# Patient Record
Sex: FEMALE | Race: WHITE | NOT HISPANIC OR LATINO
[De-identification: names, ages, dates, MRNs, and addresses within clinical notes are randomized per-mention and may not be internally consistent; named-entity substitution may affect disease eponyms.]

---

## 2018-12-03 ENCOUNTER — APPOINTMENT (OUTPATIENT)
Dept: RHEUMATOLOGY | Facility: CLINIC | Age: 48
End: 2018-12-03
Payer: COMMERCIAL

## 2018-12-03 VITALS
OXYGEN SATURATION: 99 % | DIASTOLIC BLOOD PRESSURE: 78 MMHG | TEMPERATURE: 98.2 F | HEIGHT: 60 IN | SYSTOLIC BLOOD PRESSURE: 125 MMHG | BODY MASS INDEX: 22.19 KG/M2 | HEART RATE: 90 BPM | WEIGHT: 113 LBS

## 2018-12-03 DIAGNOSIS — M25.50 PAIN IN UNSPECIFIED JOINT: ICD-10-CM

## 2018-12-03 DIAGNOSIS — Z80.3 FAMILY HISTORY OF MALIGNANT NEOPLASM OF BREAST: ICD-10-CM

## 2018-12-03 DIAGNOSIS — G25.0 ESSENTIAL TREMOR: ICD-10-CM

## 2018-12-03 DIAGNOSIS — Z87.39 PERSONAL HISTORY OF OTHER DISEASES OF THE MUSCULOSKELETAL SYSTEM AND CONNECTIVE TISSUE: ICD-10-CM

## 2018-12-03 DIAGNOSIS — Z87.76 PERSONAL HISTORY OF (CORRECTED) CONGENITAL MALFORMATIONS OF INTEGUMENT, LIMBS AND MUSCULOSKELETAL SYSTEM: ICD-10-CM

## 2018-12-03 DIAGNOSIS — K21.9 GASTRO-ESOPHAGEAL REFLUX DISEASE W/OUT ESOPHAGITIS: ICD-10-CM

## 2018-12-03 DIAGNOSIS — M79.7 FIBROMYALGIA: ICD-10-CM

## 2018-12-03 DIAGNOSIS — Z86.59 PERSONAL HISTORY OF OTHER MENTAL AND BEHAVIORAL DISORDERS: ICD-10-CM

## 2018-12-03 DIAGNOSIS — M19.90 UNSPECIFIED OSTEOARTHRITIS, UNSPECIFIED SITE: ICD-10-CM

## 2018-12-03 DIAGNOSIS — Z78.9 OTHER SPECIFIED HEALTH STATUS: ICD-10-CM

## 2018-12-03 PROCEDURE — 99205 OFFICE O/P NEW HI 60 MIN: CPT

## 2018-12-03 RX ORDER — SUCRALFATE 1 G/1
1 TABLET ORAL
Refills: 0 | Status: ACTIVE | COMMUNITY
Start: 2018-12-03

## 2018-12-03 RX ORDER — CYCLOBENZAPRINE HYDROCHLORIDE 5 MG/1
5 TABLET, FILM COATED ORAL
Qty: 15 | Refills: 0 | Status: ACTIVE | COMMUNITY
Start: 2018-12-03

## 2020-02-06 ENCOUNTER — EMERGENCY (EMERGENCY)
Facility: HOSPITAL | Age: 50
LOS: 1 days | Discharge: HOME | End: 2020-02-06
Attending: EMERGENCY MEDICINE | Admitting: EMERGENCY MEDICINE
Payer: COMMERCIAL

## 2020-02-06 VITALS
HEIGHT: 60 IN | OXYGEN SATURATION: 100 % | RESPIRATION RATE: 16 BRPM | HEART RATE: 92 BPM | DIASTOLIC BLOOD PRESSURE: 85 MMHG | SYSTOLIC BLOOD PRESSURE: 142 MMHG | WEIGHT: 115.08 LBS | TEMPERATURE: 98 F

## 2020-02-06 VITALS
DIASTOLIC BLOOD PRESSURE: 76 MMHG | RESPIRATION RATE: 15 BRPM | SYSTOLIC BLOOD PRESSURE: 117 MMHG | OXYGEN SATURATION: 99 % | HEART RATE: 62 BPM | TEMPERATURE: 98 F

## 2020-02-06 LAB
ALBUMIN SERPL ELPH-MCNC: 4.2 G/DL — SIGNIFICANT CHANGE UP (ref 3.3–5)
ALP SERPL-CCNC: 57 U/L — SIGNIFICANT CHANGE UP (ref 40–120)
ALT FLD-CCNC: 17 U/L — SIGNIFICANT CHANGE UP (ref 12–78)
ANION GAP SERPL CALC-SCNC: 7 MMOL/L — SIGNIFICANT CHANGE UP (ref 5–17)
AST SERPL-CCNC: 19 U/L — SIGNIFICANT CHANGE UP (ref 15–37)
BASOPHILS # BLD AUTO: 0.03 K/UL — SIGNIFICANT CHANGE UP (ref 0–0.2)
BASOPHILS NFR BLD AUTO: 0.5 % — SIGNIFICANT CHANGE UP (ref 0–2)
BILIRUB SERPL-MCNC: 0.3 MG/DL — SIGNIFICANT CHANGE UP (ref 0.2–1.2)
BUN SERPL-MCNC: 24 MG/DL — HIGH (ref 7–23)
CALCIUM SERPL-MCNC: 9.1 MG/DL — SIGNIFICANT CHANGE UP (ref 8.5–10.1)
CHLORIDE SERPL-SCNC: 107 MMOL/L — SIGNIFICANT CHANGE UP (ref 96–108)
CO2 SERPL-SCNC: 25 MMOL/L — SIGNIFICANT CHANGE UP (ref 22–31)
CREAT SERPL-MCNC: 0.6 MG/DL — SIGNIFICANT CHANGE UP (ref 0.5–1.3)
D DIMER BLD IA.RAPID-MCNC: <150 NG/ML DDU — SIGNIFICANT CHANGE UP
EOSINOPHIL # BLD AUTO: 0.02 K/UL — SIGNIFICANT CHANGE UP (ref 0–0.5)
EOSINOPHIL NFR BLD AUTO: 0.4 % — SIGNIFICANT CHANGE UP (ref 0–6)
GLUCOSE SERPL-MCNC: 89 MG/DL — SIGNIFICANT CHANGE UP (ref 70–99)
HCG SERPL-ACNC: <1 MIU/ML — SIGNIFICANT CHANGE UP
HCT VFR BLD CALC: 38.7 % — SIGNIFICANT CHANGE UP (ref 34.5–45)
HGB BLD-MCNC: 13.1 G/DL — SIGNIFICANT CHANGE UP (ref 11.5–15.5)
IMM GRANULOCYTES NFR BLD AUTO: 0.2 % — SIGNIFICANT CHANGE UP (ref 0–1.5)
LYMPHOCYTES # BLD AUTO: 1.55 K/UL — SIGNIFICANT CHANGE UP (ref 1–3.3)
LYMPHOCYTES # BLD AUTO: 28.2 % — SIGNIFICANT CHANGE UP (ref 13–44)
MCHC RBC-ENTMCNC: 30.8 PG — SIGNIFICANT CHANGE UP (ref 27–34)
MCHC RBC-ENTMCNC: 33.9 GM/DL — SIGNIFICANT CHANGE UP (ref 32–36)
MCV RBC AUTO: 91.1 FL — SIGNIFICANT CHANGE UP (ref 80–100)
MONOCYTES # BLD AUTO: 0.3 K/UL — SIGNIFICANT CHANGE UP (ref 0–0.9)
MONOCYTES NFR BLD AUTO: 5.5 % — SIGNIFICANT CHANGE UP (ref 2–14)
NEUTROPHILS # BLD AUTO: 3.59 K/UL — SIGNIFICANT CHANGE UP (ref 1.8–7.4)
NEUTROPHILS NFR BLD AUTO: 65.2 % — SIGNIFICANT CHANGE UP (ref 43–77)
NRBC # BLD: 0 /100 WBCS — SIGNIFICANT CHANGE UP (ref 0–0)
PLATELET # BLD AUTO: 257 K/UL — SIGNIFICANT CHANGE UP (ref 150–400)
POTASSIUM SERPL-MCNC: 3.9 MMOL/L — SIGNIFICANT CHANGE UP (ref 3.5–5.3)
POTASSIUM SERPL-SCNC: 3.9 MMOL/L — SIGNIFICANT CHANGE UP (ref 3.5–5.3)
PROT SERPL-MCNC: 7.3 G/DL — SIGNIFICANT CHANGE UP (ref 6–8.3)
RBC # BLD: 4.25 M/UL — SIGNIFICANT CHANGE UP (ref 3.8–5.2)
RBC # FLD: 12.9 % — SIGNIFICANT CHANGE UP (ref 10.3–14.5)
SODIUM SERPL-SCNC: 139 MMOL/L — SIGNIFICANT CHANGE UP (ref 135–145)
TROPONIN I SERPL-MCNC: <.015 NG/ML — SIGNIFICANT CHANGE UP (ref 0.01–0.04)
WBC # BLD: 5.5 K/UL — SIGNIFICANT CHANGE UP (ref 3.8–10.5)
WBC # FLD AUTO: 5.5 K/UL — SIGNIFICANT CHANGE UP (ref 3.8–10.5)

## 2020-02-06 PROCEDURE — 73502 X-RAY EXAM HIP UNI 2-3 VIEWS: CPT

## 2020-02-06 PROCEDURE — 96361 HYDRATE IV INFUSION ADD-ON: CPT

## 2020-02-06 PROCEDURE — 85379 FIBRIN DEGRADATION QUANT: CPT

## 2020-02-06 PROCEDURE — 84702 CHORIONIC GONADOTROPIN TEST: CPT

## 2020-02-06 PROCEDURE — 96374 THER/PROPH/DIAG INJ IV PUSH: CPT

## 2020-02-06 PROCEDURE — 99285 EMERGENCY DEPT VISIT HI MDM: CPT

## 2020-02-06 PROCEDURE — 85027 COMPLETE CBC AUTOMATED: CPT

## 2020-02-06 PROCEDURE — 84484 ASSAY OF TROPONIN QUANT: CPT

## 2020-02-06 PROCEDURE — 99284 EMERGENCY DEPT VISIT MOD MDM: CPT

## 2020-02-06 PROCEDURE — 36415 COLL VENOUS BLD VENIPUNCTURE: CPT

## 2020-02-06 PROCEDURE — 73502 X-RAY EXAM HIP UNI 2-3 VIEWS: CPT | Mod: 26,RT

## 2020-02-06 PROCEDURE — 99284 EMERGENCY DEPT VISIT MOD MDM: CPT | Mod: 25

## 2020-02-06 PROCEDURE — 96375 TX/PRO/DX INJ NEW DRUG ADDON: CPT

## 2020-02-06 PROCEDURE — 82962 GLUCOSE BLOOD TEST: CPT

## 2020-02-06 PROCEDURE — 80053 COMPREHEN METABOLIC PANEL: CPT

## 2020-02-06 RX ORDER — SODIUM CHLORIDE 9 MG/ML
500 INJECTION INTRAMUSCULAR; INTRAVENOUS; SUBCUTANEOUS ONCE
Refills: 0 | Status: COMPLETED | OUTPATIENT
Start: 2020-02-06 | End: 2020-02-06

## 2020-02-06 RX ORDER — MORPHINE SULFATE 50 MG/1
4 CAPSULE, EXTENDED RELEASE ORAL ONCE
Refills: 0 | Status: DISCONTINUED | OUTPATIENT
Start: 2020-02-06 | End: 2020-02-06

## 2020-02-06 RX ORDER — KETOROLAC TROMETHAMINE 30 MG/ML
30 SYRINGE (ML) INJECTION ONCE
Refills: 0 | Status: DISCONTINUED | OUTPATIENT
Start: 2020-02-06 | End: 2020-02-06

## 2020-02-06 RX ADMIN — MORPHINE SULFATE 4 MILLIGRAM(S): 50 CAPSULE, EXTENDED RELEASE ORAL at 13:40

## 2020-02-06 RX ADMIN — SODIUM CHLORIDE 500 MILLILITER(S): 9 INJECTION INTRAMUSCULAR; INTRAVENOUS; SUBCUTANEOUS at 13:49

## 2020-02-06 RX ADMIN — MORPHINE SULFATE 4 MILLIGRAM(S): 50 CAPSULE, EXTENDED RELEASE ORAL at 13:24

## 2020-02-06 RX ADMIN — Medication 30 MILLIGRAM(S): at 13:07

## 2020-02-06 RX ADMIN — Medication 30 MILLIGRAM(S): at 12:49

## 2020-02-06 RX ADMIN — SODIUM CHLORIDE 500 MILLILITER(S): 9 INJECTION INTRAMUSCULAR; INTRAVENOUS; SUBCUTANEOUS at 12:49

## 2020-02-06 NOTE — ED PROVIDER NOTE - NS ED ROS FT
Constitutional: - Fever, - Chills, - Anorexia, - Fatigue, - Night sweats  Eyes: - Discharge, - Irritation, - Redness, - Visual changes, - Light sensitivity, - Pain  EARS: - Ear Pain, - Tinnitus, - Decreased hearing  NOSE: - Congestion, - Bloody nose  MOUTH/THROAT: - Vocal Changes, - Drooling, - Sore throat  NECK: - Lumps, - Stiffness, - Pain  CV: - Palpitations, - Chest Pain, - Edema, + Syncope  RESP:  - Cough, - Shortness of Breath, - Dyspnea on Exertion, - Trouble speaking, - Pleuritic pain - Wheezing  GI: - Diarrhea, - Constipation, - Bloody stools, - Nausea, - Vomiting, - Abdominal Pain  : - Dysuria, -Frequency, - Hematuria, - Hesitancy, - Incontinence, - Saddle Anesthesia, - Abnormal discharge  MSK: - Myalgias, - Arthralgias, - Weakness, - Deformities, +hip pain  SKIN: - Color change, - Rash, - Swelling, - Ecchymosis, - Abrasion, - laceration  NEURO: - Change in behavior, - Dec. Alertness, - Headache, - Dizziness, - Change in speech, - Weakness, - Seizure-like activity, - Difficulty ambulating

## 2020-02-06 NOTE — CONSULT NOTE ADULT - ATTENDING COMMENTS
Seen/examined. agree with above.  Vasovagal syncope  hydration and orthostatics  outpt follow up for echocardiogram  no contraindication to d/c home

## 2020-02-06 NOTE — ED PROVIDER NOTE - CARE PROVIDER_API CALL
Randy Ovalles)  Orthopaedic Surgery  [address] [place] [address]  Phone: [phone]  Fax: [phone]  Follow Up Time:

## 2020-02-06 NOTE — ED PROVIDER NOTE - PATIENT PORTAL LINK FT
You can access the FollowMyHealth Patient Portal offered by [facility] by registering at the following website: [email or web address]. By joining [employer]’s FollowMyHealth portal, you will also be able to view your health information using other applications (apps) compatible with our system.

## 2020-02-06 NOTE — ED PROVIDER NOTE - PHYSICAL EXAMINATION
Gen: Alert, NAD  Head/eyes: NC/AT, PERRL, EOMI  ENT: airway patent  Neck: supple, no tenderness/meningismus/JVD, Trachea midline  Pulm/lung: Bilateral clear BS, normal resp effort, no wheeze/stridor/retractions  CV/heart: RRR, no M/R/G, +2 dist pulses (radial, pedal DP/PT, popliteal)  GI/Abd: soft, NT/ND, +BS, no guarding/rebound tenderness  Musculoskeletal: no edema/erythema/cyanosis, FROM in all extremities except for right hip secondary to pain, no C/T/L spine ttp  Skin: no rash, no vesicles, no petechaie, no ecchymosis, no swelling  Neuro: AAOx3, CN 2-12 intact, normal sensation, 5/5 motor strength in all extremities, normal gait, no dysmetria

## 2020-02-06 NOTE — CONSULT NOTE ADULT - SUBJECTIVE AND OBJECTIVE BOX
INCOMPLETE      Patient is a 49y old  Female who presents with a chief complaint of     HPI:      PAST MEDICAL & SURGICAL HISTORY:            ECHO  FINDINGS:      MEDICATIONS  (STANDING):    MEDICATIONS  (PRN):      FAMILY HISTORY:    Denies Family history of CAD or early MI      Constitutional: denies fever, chills  HEENT: denies blurry vision, difficulty hearing  Respiratory: denies SOB, CHAPMAN, cough  Cardiovascular: denies CP, palpitations, orthopnea, PND, LE edema  Gastrointestinal: denies nausea, vomiting, abdominal pain  Genitourinary: denies urinary changes  Skin: Denies rashes, itching  Neurologic: denies headache, weakness, dizziness  Hematology/Oncology: denies bleeding, easy bruising  ROS negative except as noted above      SOCIAL HISTORY:    No tobacco, Alcohol or Ddrug use    Vital Signs Last 24 Hrs  T(C): 36.7 (06 Feb 2020 12:45), Max: 36.7 (06 Feb 2020 12:45)  T(F): 98.1 (06 Feb 2020 12:45), Max: 98.1 (06 Feb 2020 12:45)  HR: 66 (06 Feb 2020 12:45) (66 - 92)  BP: 114/67 (06 Feb 2020 12:45) (114/67 - 142/85)  BP(mean): --  RR: 15 (06 Feb 2020 12:45) (15 - 16)  SpO2: 99% (06 Feb 2020 12:45) (99% - 100%)    Physical Exam:  General: Well developed, well nourished, NAD  HEENT: NCAT, PERRLA, EOMI bl, moist mucous membranes   Neck: Supple, nontender, no mass  Neurology: A&Ox3, nonfocal, sensation intact   Respiratory: CTA B/L, No W/R/R  CV: RRR, +S1/S2, no murmurs, rubs or gallops  Abdominal: Soft, NT, ND +BSx4, no palpable masses  Extremities: No C/C/E, + peripheral pulses  MSK: Normal ROM, no joint erythema or warmth, no joint swelling   Heme: No obvious ecchymosis or petechiae   Skin: warm, dry, normal color      ECG:    I&O's Detail      LABS:                        13.1   5.50  )-----------( 257      ( 06 Feb 2020 12:43 )             38.7                   I&O's Summary    BNP  RADIOLOGY & ADDITIONAL STUDIES: Patient is a 49y old  Female who presents with a chief complaint of syncope    HPI: Patient is a 50 y/o F with pmhx of anxiety, Angel Danlos syndrome (unknown type), vasovagal syncope, fibromyalgia who presents with syncope in the AM. Patient states she woke up with pain in her R hip worse than usual. Patient states she often has subluxation of her hip due to dysplasia that clicks back after rotating her leg several times. However, patient states she was unable to do that today 2/2 pain. 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 She states she "felt myself go down." Patient denies any head trauma and states she was able to put her hands in front of her. Patient states her vision went dark but she was still able to hear the people around her. Patient states she felt better immediately within 1 minute of being on the floor. Patient states she has history of vasovagal syncope that is usually brought on by anxiety. Patient previously saw a cardiologist from [place] for workup but states she has not followed up in years. Patients denies ever having echo or stress test. Of note, patient states she has been under tremendous stress lately due to her  leaving.       PAST MEDICAL & SURGICAL HISTORY:  No pertinent past medical history      ECHO  FINDINGS: Never had      MEDICATIONS  (STANDING):  MEDICATIONS  (PRN):      FAMILY HISTORY:  Denies Family history of CAD or early MI      Constitutional: denies fever, chills  HEENT: denies blurry vision, difficulty hearing  Respiratory: denies SOB, CHAPMAN, cough  Cardiovascular: denies CP, palpitations, orthopnea, PND, LE edema  Gastrointestinal: denies nausea, vomiting, abdominal pain  Genitourinary: denies urinary changes  Neurologic: denies headache, weakness, dizziness  Hematology/Oncology: denies bleeding, easy bruising  Extremities: admits pain to R hip    SOCIAL HISTORY:  Current smoker, for the past 20 years, patient states she has cut down to 1 pack per a week for the last 6 months. Previously "way more"  Denies etoh or drug use  Ambulates without assistance, limps at time 2/2 hip dysplasia  Independent with ADLs    Vital Signs Last 24 Hrs  T(C): 36.7 (06 Feb 2020 12:45), Max: 36.7 (06 Feb 2020 12:45)  T(F): 98.1 (06 Feb 2020 12:45), Max: 98.1 (06 Feb 2020 12:45)  HR: 66 (06 Feb 2020 12:45) (66 - 92)  BP: 114/67 (06 Feb 2020 12:45) (114/67 - 142/85)  BP(mean): --  RR: 15 (06 Feb 2020 12:45) (15 - 16)  SpO2: 99% (06 Feb 2020 12:45) (99% - 100%)    Physical Exam:  General: Well developed, well nourished, NAD  HEENT: NCAT, PERRLA, EOMI bl  Neck: Supple, nontender, no mass  Neurology: A&Ox3, nonfocal, sensation intact   Respiratory: CTA B/L, No W/R/R  CV: RRR, +S1/S2, no murmurs, rubs or gallops  Abdominal: Soft, NT, ND +BSx4, no palpable masses  Extremities: +pain to R hip, peripheral pulses intact  MSK: Normal ROM, no joint erythema or warmth, no joint swelling   Heme: + large ecchymosis on R forearm 2/2 tripping down the stairs recently due to her knee locking  Skin: warm, dry, normal color      ECG: NSR 79 bpm    I&O's Detail      LABS:                        13.1   5.50  )-----------( 257      ( 06 Feb 2020 12:43 )             38.7     02-06    139  |  107  |  24<H>  ----------------------------<  89  3.9   |  25  |  0.60    Ca    9.1      06 Feb 2020 12:43    TPro  7.3  /  Alb  4.2  /  TBili  0.3  /  DBili  x   /  AST  19  /  ALT  17  /  AlkPhos  57  02-06    CARDIAC MARKERS ( 06 Feb 2020 12:43 )  <.015 ng/mL / x     / x     / x     / x            I&O's Summary    BNP  RADIOLOGY & ADDITIONAL STUDIES: Patient is a 49y old  Female who presents with a chief complaint of syncope    HPI: Patient is a 50 y/o F with pmhx of anxiety, Angel Danlos syndrome (unknown type), vasovagal syncope, MVP, fibromyalgia who presents with syncope in the AM. Patient states she woke up with pain in her R hip worse than usual. Patient states she often has subluxation of her hip due to dysplasia that clicks back after rotating her leg several times. However, patient states she was unable to do that today 2/2 pain. 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 She states she "felt myself go down." Patient denies any head trauma and states she was able to put her hands in front of her. Patient states her vision went dark but she was still able to hear the people around her. Patient states she felt better immediately within 1 minute of being on the floor. Patient states she has history of vasovagal syncope that is usually brought on by anxiety. Patient previously saw a cardiologist from [place] for workup but states she has not followed up in years. Patients denies ever having echo or stress test. Of note, patient states she has been under tremendous stress lately due to her  leaving.       PAST MEDICAL & SURGICAL HISTORY:  No pertinent past medical history      ECHO  FINDINGS: Never had      MEDICATIONS  (STANDING):  MEDICATIONS  (PRN):      FAMILY HISTORY:  Denies Family history of CAD or early MI      Constitutional: denies fever, chills  HEENT: denies blurry vision, difficulty hearing  Respiratory: denies SOB, CHAPMAN, cough  Cardiovascular: denies CP, palpitations, orthopnea, PND, LE edema  Gastrointestinal: denies nausea, vomiting, abdominal pain  Genitourinary: denies urinary changes  Neurologic: denies headache, weakness, dizziness  Hematology/Oncology: denies bleeding, easy bruising  Extremities: admits pain to R hip    SOCIAL HISTORY:  Current smoker, for the past 20 years, patient states she has cut down to 1 pack per a week for the last 6 months. Previously "way more"  Denies etoh or drug use  Ambulates without assistance, limps at time 2/2 hip dysplasia  Independent with ADLs    Vital Signs Last 24 Hrs  T(C): 36.7 (06 Feb 2020 12:45), Max: 36.7 (06 Feb 2020 12:45)  T(F): 98.1 (06 Feb 2020 12:45), Max: 98.1 (06 Feb 2020 12:45)  HR: 66 (06 Feb 2020 12:45) (66 - 92)  BP: 114/67 (06 Feb 2020 12:45) (114/67 - 142/85)  BP(mean): --  RR: 15 (06 Feb 2020 12:45) (15 - 16)  SpO2: 99% (06 Feb 2020 12:45) (99% - 100%)    Physical Exam:  General: Well developed, well nourished, NAD  HEENT: NCAT, PERRLA, EOMI bl  Neck: Supple, nontender, no mass  Neurology: A&Ox3, nonfocal, sensation intact   Respiratory: CTA B/L, No W/R/R  CV: RRR, +S1/S2, no murmurs, rubs or gallops  Abdominal: Soft, NT, ND +BSx4, no palpable masses  Extremities: +pain to R hip, peripheral pulses intact  MSK: Normal ROM, no joint erythema or warmth, no joint swelling   Heme: + large ecchymosis on R forearm 2/2 tripping down the stairs recently due to her knee locking  Skin: warm, dry, normal color      ECG: NSR 79 bpm    I&O's Detail      LABS:                        13.1   5.50  )-----------( 257      ( 06 Feb 2020 12:43 )             38.7     02-06    139  |  107  |  24<H>  ----------------------------<  89  3.9   |  25  |  0.60    Ca    9.1      06 Feb 2020 12:43    TPro  7.3  /  Alb  4.2  /  TBili  0.3  /  DBili  x   /  AST  19  /  ALT  17  /  AlkPhos  57  02-06    CARDIAC MARKERS ( 06 Feb 2020 12:43 )  <.015 ng/mL / x     / x     / x     / x            I&O's Summary    BNP  RADIOLOGY & ADDITIONAL STUDIES: Patient is a 49y old  Female who presents with a chief complaint of syncope    HPI: Patient is a 48 y/o F with pmhx of anxiety, Angel Danlos syndrome (unknown type), vasovagal syncope, MVP, fibromyalgia who presents with syncope in the AM. Patient states she woke up with pain in her R hip worse than usual. Patient states she often has subluxation of her hip due to dysplasia that clicks back after rotating her leg several times. However, patient states she was unable to do that today 2/2 pain. 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 She states she "felt myself go down." Patient denies any head trauma and states she was able to put her hands in front of her. Patient states her vision went dark but she was still able to hear the people around her. Patient states she felt better immediately within 1 minute of being on the floor. Patient states she has history of vasovagal syncope that is usually brought on by anxiety. Patient previously saw a cardiologist from [place] for workup but states she has not followed up in years. Patients denies ever having stress test. Has had echo in the past, states everything was fine. Of note, patient states she has been under tremendous stress lately due to her  leaving.       PAST MEDICAL & SURGICAL HISTORY:  No pertinent past medical history      ECHO  FINDINGS: Never had      MEDICATIONS  (STANDING):  MEDICATIONS  (PRN):      FAMILY HISTORY:  Denies Family history of CAD or early MI      Constitutional: denies fever, chills  HEENT: denies blurry vision, difficulty hearing  Respiratory: denies SOB, CHAPMAN, cough  Cardiovascular: denies CP, palpitations, orthopnea, PND, LE edema  Gastrointestinal: denies nausea, vomiting, abdominal pain  Genitourinary: denies urinary changes  Neurologic: denies headache, weakness, dizziness  Hematology/Oncology: denies bleeding, easy bruising  Extremities: admits pain to R hip    SOCIAL HISTORY:  Current smoker, for the past 20 years, patient states she has cut down to 1 pack per a week for the last 6 months. Previously "way more"  Denies etoh or drug use  Ambulates without assistance, limps at time 2/2 hip dysplasia  Independent with ADLs    Vital Signs Last 24 Hrs  T(C): 36.7 (06 Feb 2020 12:45), Max: 36.7 (06 Feb 2020 12:45)  T(F): 98.1 (06 Feb 2020 12:45), Max: 98.1 (06 Feb 2020 12:45)  HR: 66 (06 Feb 2020 12:45) (66 - 92)  BP: 114/67 (06 Feb 2020 12:45) (114/67 - 142/85)  BP(mean): --  RR: 15 (06 Feb 2020 12:45) (15 - 16)  SpO2: 99% (06 Feb 2020 12:45) (99% - 100%)    Physical Exam:  General: Well developed, well nourished, NAD  HEENT: NCAT, PERRLA, EOMI bl  Neck: Supple, nontender, no mass  Neurology: A&Ox3, nonfocal, sensation intact   Respiratory: CTA B/L, No W/R/R  CV: RRR, +S1/S2, no murmurs, rubs or gallops  Abdominal: Soft, NT, ND +BSx4, no palpable masses  Extremities: +pain to R hip, peripheral pulses intact  MSK: Normal ROM, no joint erythema or warmth, no joint swelling   Heme: + large ecchymosis on R forearm 2/2 tripping down the stairs recently due to her knee locking  Skin: warm, dry, normal color      ECG: NSR 79 bpm    I&O's Detail      LABS:                        13.1   5.50  )-----------( 257      ( 06 Feb 2020 12:43 )             38.7     02-06    139  |  107  |  24<H>  ----------------------------<  89  3.9   |  25  |  0.60    Ca    9.1      06 Feb 2020 12:43    TPro  7.3  /  Alb  4.2  /  TBili  0.3  /  DBili  x   /  AST  19  /  ALT  17  /  AlkPhos  57  02-06    CARDIAC MARKERS ( 06 Feb 2020 12:43 )  <.015 ng/mL / x     / x     / x     / x            I&O's Summary    BNP  RADIOLOGY & ADDITIONAL STUDIES:

## 2020-02-06 NOTE — ED ADULT NURSE NOTE - OBJECTIVE STATEMENT
49 year old female presents to the ED complaining of syncope and right hip pain. 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 Patient reports this morning when she woke up she felt a little bit dizzy but had to go to work. Patient drove herself of work. Patient reports while at work she bent down and felt excruciating 10/10 pain to the right hip. Patient admits to syncope and collapse but feels it was due to the amount of pain she was in. Coworkers helped patient to a chair and she called her father to bring her to the ED. Patient denies headache. Patient denies dizziness in ED. Patient denies chest pain, shortness of breath or palpitations. Patient continues to complain of right hip pain 10/10. Pain radiates to the groin. Patient admits to generalized numbness but nothing acute related to today's incident. Patient admits to a lot of stress at home with moving and going through a divorce which she attributes to the flare of fibromyalgia and EDS. Patient anxious through initial RN assessment. Patient otherwise appears well. Patient moving all extremities except right lower without difficulty. Patient ambulatory with stand by assist in ED. Patient able to bear partial weight to right lower extremity.

## 2020-02-06 NOTE — ED ADULT NURSE NOTE - NSIMPLEMENTINTERV_GEN_ALL_ED
Implemented All Fall Risk Interventions:  [place] to call system. Call bell, personal items and telephone within reach. Instruct patient to call for assistance. Room bathroom lighting operational. Non-slip footwear when patient is off stretcher. Physically safe environment: no spills, clutter or unnecessary equipment. Stretcher in lowest position, wheels locked, appropriate side rails in place. Provide visual cue, wrist band, yellow gown, etc. Monitor gait and stability. Monitor for mental status changes and reorient to person, place, and time. Review medications for side effects contributing to fall risk. Reinforce activity limits and safety measures with patient and family.

## 2020-02-06 NOTE — ED PROVIDER NOTE - NSFOLLOWUPINSTRUCTIONS_ED_ALL_ED_FT
1) Follow-up with your Primary Medical Doctor and Dr. Ovalles. Call today / next business day for prompt follow-up.  2) Return to Emergency room for any worsening or persistent pain, weakness, fever, or any other concerning symptoms.  3) See attached instruction sheets for additional information, including information regarding signs and symptoms to look out for, reasons to seek immediate care and other important instructions.  4) Follow-up with any specialists as discussed / noted as well.

## 2020-02-06 NOTE — CONSULT NOTE ADULT - ASSESSMENT
Patient is a 50 y/o F with pmhx of anxiety, Angel Danlos syndrome (unknown type), vasovagal syncope, fibromyalgia who presents with syncope in the AM.    Syncope  -Likely 2/2 vasovagal as patient was in pain at the time.  -Has had previous episodes of vasovagal episodes 2/2 anxiety  -Was previously seen by cardiologist years ago for syncope w/u  -Patient is not complaining of any cardiac symptoms at this time, trop negative x1.   -EKG shows NSR at 79 bpm  -BP well controlled, monitor routine hemodynamics  -No contraindication for discharge from cardiac standpoint Patient is a 48 y/o F with pmhx of anxiety, Angel Danlos syndrome (unknown type), vasovagal syncope, fibromyalgia who presents with syncope in the AM.    Syncope  -Likely 2/2 vasovagal as patient was in pain at the time.  -Has had previous episodes of vasovagal episodes 2/2 anxiety  -Was previously seen by cardiologist years ago for syncope w/u. However, has not followed recently. Should follow up as patient has EDS  -Will order d-dimer to rule out aortic dissection in setting of EDS  -Patient is not complaining of any cardiac symptoms at this time, trop negative x1.   -EKG shows NSR at 79 bpm  -BP well controlled, monitor routine hemodynamics  -Needs further evaluation for R hip pain as patient unable to walk at this time.  -No contraindication for discharge from cardiac standpoint if d-dimer negative with outpatient follow up. Patient is a 48 y/o F with pmhx of anxiety, Angel Danlos syndrome (unknown type), vasovagal syncope, fibromyalgia who presents with syncope in the AM.    Syncope  -Likely 2/2 vasovagal as patient was in pain at the time.  -Has had previous episodes of vasovagal episodes 2/2 anxiety  -Was previously seen by cardiologist years ago for syncope w/u. However, has not followed recently. Should follow up as patient has EDS  -Will order d-dimer in setting of EDS/syncope (and possibility of aortic issues)  -Patient is not complaining of any cardiac symptoms at this time, trop negative x1.   -EKG shows NSR at 79 bpm  -BP well controlled, monitor routine hemodynamics  -Needs further evaluation for R hip pain as patient unable to walk at this time.  -No contraindication for discharge from cardiac standpoint if d-dimer negative with outpatient follow up.

## 2020-02-06 NOTE — ED PROVIDER NOTE - OBJECTIVE STATEMENT
48 yo female hx of michelle danlos syndrome, fibromyalgia c/o episode of syncope at work today, landed on her right side c/o right hip pain, nonradiating, moderate pain, worse with movement, denies any head injury, no neck pain.  +LOC.

## 2020-02-06 NOTE — ED ADULT NURSE REASSESSMENT NOTE - NS ED NURSE REASSESS COMMENT FT1
Patient ambulatory independently without assistance. Stable gait. Patient reports improvement to pain of right hip. ED MD Nolasco bedside for ambulation. Plan for discharge.

## 2020-02-06 NOTE — ED PROVIDER NOTE - PROGRESS NOTE DETAILS
labs and imaging explained, cleared by Dr. Griselda dunlap f/u with pmd and orthopedist, walking in the ED without any assistance

## 2022-07-05 PROBLEM — Z78.9 OTHER SPECIFIED HEALTH STATUS: Chronic | Status: ACTIVE | Noted: 2020-02-06

## 2022-07-15 ENCOUNTER — APPOINTMENT (OUTPATIENT)
Dept: ORTHOPEDIC SURGERY | Facility: CLINIC | Age: 52
End: 2022-07-15

## 2022-07-15 VITALS
DIASTOLIC BLOOD PRESSURE: 70 MMHG | HEIGHT: 60 IN | BODY MASS INDEX: 24.94 KG/M2 | SYSTOLIC BLOOD PRESSURE: 120 MMHG | WEIGHT: 127 LBS

## 2022-07-15 DIAGNOSIS — M70.61 TROCHANTERIC BURSITIS, RIGHT HIP: ICD-10-CM

## 2022-07-15 DIAGNOSIS — Q65.89 OTHER SPECIFIED CONGENITAL DEFORMITIES OF HIP: ICD-10-CM

## 2022-07-15 PROCEDURE — 99204 OFFICE O/P NEW MOD 45 MIN: CPT | Mod: 25

## 2022-07-15 PROCEDURE — 72100 X-RAY EXAM L-S SPINE 2/3 VWS: CPT

## 2022-07-15 PROCEDURE — 73502 X-RAY EXAM HIP UNI 2-3 VIEWS: CPT | Mod: RT

## 2022-07-15 PROCEDURE — 20610 DRAIN/INJ JOINT/BURSA W/O US: CPT | Mod: RT

## 2022-07-21 PROBLEM — M70.61 GREATER TROCHANTERIC BURSITIS OF RIGHT HIP: Status: ACTIVE | Noted: 2022-07-21

## 2023-01-30 ENCOUNTER — NON-APPOINTMENT (OUTPATIENT)
Age: 53
End: 2023-01-30

## 2023-02-01 ENCOUNTER — NON-APPOINTMENT (OUTPATIENT)
Age: 53
End: 2023-02-01

## 2023-02-01 ENCOUNTER — APPOINTMENT (OUTPATIENT)
Dept: OTOLARYNGOLOGY | Facility: CLINIC | Age: 53
End: 2023-02-01
Payer: MEDICAID

## 2023-02-01 VITALS
HEIGHT: 60 IN | BODY MASS INDEX: 26.5 KG/M2 | HEART RATE: 106 BPM | DIASTOLIC BLOOD PRESSURE: 83 MMHG | SYSTOLIC BLOOD PRESSURE: 139 MMHG | WEIGHT: 135 LBS

## 2023-02-01 PROCEDURE — 92557 COMPREHENSIVE HEARING TEST: CPT

## 2023-02-01 PROCEDURE — 99204 OFFICE O/P NEW MOD 45 MIN: CPT

## 2023-02-01 PROCEDURE — 92550 TYMPANOMETRY & REFLEX THRESH: CPT

## 2023-02-01 NOTE — REVIEW OF SYSTEMS
[Ear Pain] : ear pain [Hearing Loss] : hearing loss [Dizziness] : dizziness [Recurrent Ear Infections] : recurrent ear infections [Ear Noises] : ear noises [Nasal Congestion] : nasal congestion [Sinus Pressure] : sinus pressure [Sense Of Smell Problem] : sense of smell problem [Swelling Neck] : swelling neck [Heartburn] : heartburn [Anxiety] : anxiety [Negative] : Endocrine [FreeTextEntry9] : Muscle aches [de-identified] : Sweating at night

## 2023-02-01 NOTE — ASSESSMENT
[FreeTextEntry1] : Reviewed and reconciled medications, allergies, PMHx, PSHx, SocHx, FMHx.\par \par Pt presents today hearing loss. Pt notes her symptoms started with a left ear feeling muffled, pressure and moved to the right- now feels it in both ears. Pt notes her hearing is very muffled and her ears felt clogged. Pt notes 1/8/23 the hearing loss started and she felt dizzy. Pt notes she was put on Cipro ear drops and Cefprozil. Pt notes the original abx didn't work so she was put on Amoxicillin. Pt notes she feels off balanced like she is on a boat. \par \par Physical Exam -\par right ear cerumen removed, otherwise looks normal\par left ear normal\par tuning fork lateralizes to the right\par deviated septum, mildly inflamed turbs \par \par Audio:\par -TYMPS: TYPE A (ETF ABNORMAL) AD, TYPE B AS\par -AD: HEARING ESSENTIALLY WNL/ BORDERLINE WNL TO A MILD HF HL AT 8000 HZ , 100% discrim at 60db\par -AS: ESSENTIALLY MODERATE/ MILD -8000 HZ, 96% discrim at 75db, fluid present\par \par Plan:\par Cerumen removed. Audio - results interpreted by Dr. Mehta and reviewed with the patient. Discussed r/b/a of tube in ear, drain ear, or continue sprays. Continue decongestant. Schedule left myringotomy with tube once insurance approves.

## 2023-02-01 NOTE — ADDENDUM
[FreeTextEntry1] : Documented by Altagracia Gutierrez acting as scribe for Dr. Mehta on 02/01/2023.\par \par All Medical record entries made by the scribe were at my, Dr. Mehta,direction and personally dictated by me on 02/01/2023. I have reviewed the chart and agree that the record accurately reflects my personal performance of the history, physical exam, assessment and plan. I have also personally directed, reviewed, and agreed with the discharge instructions.

## 2023-02-01 NOTE — DATA REVIEWED
[de-identified] : \par -TYMPS: TYPE A (ETF ABNORMAL) AD, TYPE B AS\par -AD: HEARING ESSENTIALLY WNL/ BORDERLINE WNL TO A MILD HF HL AT 8000 HZ \par -AS: ESSENTIALLY MODERATE/ MILD -8000 HZ \par

## 2023-02-01 NOTE — CONSULT LETTER
[Dear  ___] : Dear  [unfilled], [Consult Letter:] : I had the pleasure of evaluating your patient, [unfilled]. [Please see my note below.] : Please see my note below. [Consult Closing:] : Thank you very much for allowing me to participate in the care of this patient.  If you have any questions, please do not hesitate to contact me. [Sincerely,] : Sincerely, [FreeTextEntry3] : Claude Mehta MD FACS

## 2023-02-01 NOTE — PHYSICAL EXAM
[Cuevas Test Lateralizes To Right] : tone lateralization to the right [] : septum deviated bilaterally [Normal] : mucosa is normal [Midline] : trachea located in midline position [FreeTextEntry8] : cerumen removed via curettage  [de-identified] : looks normal [de-identified] : mildly inflamed turbs  [FreeTextEntry2] : mild left maxillary tenderness

## 2023-02-01 NOTE — HISTORY OF PRESENT ILLNESS
[de-identified] : Pt presents today hearing loss. Pt notes her symptoms started with a left ear feeling muffled, pressure and moved to the right- now feels it in both ears. Pt notes her hearing is very muffled and her ears felt clogged. Pt notes 1/8/23 the hearing loss started and she felt dizzy. Pt notes she was put on Cipro ear drops and Cefprozil. Pt notes the original abx didn't work and went to urgent care and they told her she had right ear infection and was put on Augmentin. Pt notes she feels off balanced like she is on a boat. Pt notes she was already on steroid which she finished 1/25/23. Pt notes she has tried Flonase and Decongestants.

## 2023-02-03 ENCOUNTER — APPOINTMENT (OUTPATIENT)
Dept: OTOLARYNGOLOGY | Facility: CLINIC | Age: 53
End: 2023-02-03
Payer: MEDICAID

## 2023-02-03 VITALS
HEART RATE: 98 BPM | BODY MASS INDEX: 26.5 KG/M2 | SYSTOLIC BLOOD PRESSURE: 120 MMHG | WEIGHT: 135 LBS | DIASTOLIC BLOOD PRESSURE: 81 MMHG | HEIGHT: 60 IN

## 2023-02-03 PROCEDURE — 69433 CREATE EARDRUM OPENING: CPT | Mod: LT

## 2023-02-03 NOTE — ASSESSMENT
[FreeTextEntry1] : Reviewed and reconciled medications, allergies, PMHx, PSHx, SocHx, FMHx \par \par Pt. with h/o HL, clogged/muffled sensation, and dizziness presents today for Left Ear Myringotomy with Tube Placement. \par \par ENT Procedure: Left Ear Myringotomy With Tube Placement (without flange). \par \par Plan: Keep ear dry - use wax or silicone ear plug. Avoid hard nose blowing. FU 10-14 days

## 2023-02-03 NOTE — HISTORY OF PRESENT ILLNESS
[de-identified] : Pt. with h/o HL, clogged/muffled ears, and dizziness presents today for Left Ear Myringotomy with Tube Placement.

## 2023-02-03 NOTE — PROCEDURE
[FreeTextEntry1] : Left Ear Myringotomy With Tube Placement (without flange).  [FreeTextEntry2] : clogged ear [FreeTextEntry3] : Procedure: Left Ear Myringotomy With Tube Placement (without flange). The risks, benefits, and alternatives of myringotomy tube placement were discussed with the patient. The external auditory canal was then evaluated under microscopy. All cerumen was removed. Topical phenol was touched to the tympanic membrane. A myringotomy knife was used to make an incision in the posterioinferior quadrant of the right tympanic membrane. Fluid suctioned clear. A myringotomy tube was then placed without difficulty. Patient tolerated procedure well. Dry ear precautions stressed. FU established.

## 2023-02-03 NOTE — CONSULT LETTER
[Dear  ___] : Dear  [unfilled], [Courtesy Letter:] : I had the pleasure of seeing your patient, [unfilled], in my office today. [Please see my note below.] : Please see my note below. [Consult Closing:] : Thank you very much for allowing me to participate in the care of this patient.  If you have any questions, please do not hesitate to contact me. [Sincerely,] : Sincerely, [FreeTextEntry1] : Claude Mehta MD FACS

## 2023-02-06 ENCOUNTER — APPOINTMENT (OUTPATIENT)
Dept: OTOLARYNGOLOGY | Facility: CLINIC | Age: 53
End: 2023-02-06
Payer: MEDICAID

## 2023-02-06 PROCEDURE — 92567 TYMPANOMETRY: CPT

## 2023-02-06 PROCEDURE — 99213 OFFICE O/P EST LOW 20 MIN: CPT | Mod: 24

## 2023-02-06 RX ORDER — SERTRALINE HYDROCHLORIDE 25 MG/1
TABLET, FILM COATED ORAL
Refills: 0 | Status: DISCONTINUED | COMMUNITY
End: 2023-02-06

## 2023-02-06 RX ORDER — PROPRANOLOL HYDROCHLORIDE 40 MG/1
40 TABLET ORAL
Qty: 30 | Refills: 0 | Status: DISCONTINUED | COMMUNITY
Start: 2018-12-03 | End: 2023-02-06

## 2023-02-06 RX ORDER — IRON/IRON ASP GLY/FA/MV-MIN 38 125-25-1MG
TABLET ORAL
Refills: 0 | Status: ACTIVE | COMMUNITY

## 2023-02-06 RX ORDER — NAPROXEN 500 MG/1
500 TABLET ORAL
Qty: 60 | Refills: 0 | Status: DISCONTINUED | COMMUNITY
Start: 2018-12-03 | End: 2023-02-06

## 2023-02-06 NOTE — PHYSICAL EXAM
[Normal] : the left mastoid was normal [Hearing Loss Right Only] : normal [Hearing Loss Left Only] : normal [FreeTextEntry9] : tube in place [de-identified] : tube in place

## 2023-02-06 NOTE — CONSULT LETTER
[Dear  ___] : Dear  [unfilled], [Courtesy Letter:] : I had the pleasure of seeing your patient, [unfilled], in my office today. [Please see my note below.] : Please see my note below. [Referral Closing:] : Thank you very much for seeing this patient.  If you have any questions, please do not hesitate to contact me. [Sincerely,] : Sincerely, [FreeTextEntry3] : Claude Mehta MD FACS\par

## 2023-02-06 NOTE — HISTORY OF PRESENT ILLNESS
[de-identified] : Pt. with h/o HL, clogged/muffled ears, and dizziness presents today s/p Left Ear Myringotomy with Tube Placement on 2/3/23. Patient states that her ears still feel clogged even after her the tube was placed.

## 2023-02-06 NOTE — ASSESSMENT
[FreeTextEntry1] : Reviewed and reconciled medications, allergies, PMHx, PSHx, SocHx, FMHx \par \par Pt. with h/o HL, clogged/muffled sensation, and dizziness presents today for Left Ear Myringotomy with Tube Placement. \par \par Physical exam:\par right ear normal\par Left ear tube in place\par \par Placed ciprofloxacin drops in left ear tube and had patient valsalva which showed bubbles formation. Which shows tube is in proper position and working. \par \par Audio:\par Right Type A ECV 1.2TPP -4\par Left Type B ECV 1.6 TPP NP\par \par \par Plan: Keep ear dry - use wax or silicone ear plug. Avoid hard nose blowing. FU 10-14 days. \par Patient will let us know if there is an improvement if not Ct of ears for further evaulation.

## 2023-02-13 ENCOUNTER — APPOINTMENT (OUTPATIENT)
Dept: OTOLARYNGOLOGY | Facility: CLINIC | Age: 53
End: 2023-02-13
Payer: MEDICAID

## 2023-02-13 VITALS
DIASTOLIC BLOOD PRESSURE: 88 MMHG | HEART RATE: 118 BPM | HEIGHT: 60 IN | BODY MASS INDEX: 26.5 KG/M2 | SYSTOLIC BLOOD PRESSURE: 121 MMHG | WEIGHT: 135 LBS

## 2023-02-13 DIAGNOSIS — G50.1 ATYPICAL FACIAL PAIN: ICD-10-CM

## 2023-02-13 DIAGNOSIS — Q79.60 EHLERS-DANLOS SYNDROME, UNSP: ICD-10-CM

## 2023-02-13 PROCEDURE — 92557 COMPREHENSIVE HEARING TEST: CPT

## 2023-02-13 PROCEDURE — 92567 TYMPANOMETRY: CPT

## 2023-02-13 PROCEDURE — 99213 OFFICE O/P EST LOW 20 MIN: CPT | Mod: 25

## 2023-02-13 PROCEDURE — 31231 NASAL ENDOSCOPY DX: CPT | Mod: RT

## 2023-02-13 NOTE — PROCEDURE
[FreeTextEntry6] : Procedure: Flexible Nasal Endoscopy: Risks, benefits, and alternatives of flexible endoscopy were explained to the patient. The patient gave oral consent to proceed. The flexible scope was inserted into the right nasal cavity. Endoscopy of the inferior and middle meatus was performed. No polyp, mass, or lesion was appreciated. Olfactory cleft was clear. Spheno-ethmoid recess is clear. Turbinates were without mass. The procedure was repeated on the contralateral side with deviated septum. Can't get back to nasopharynx.   [de-identified] : check nasopharynx, eustachian tube orifices

## 2023-02-13 NOTE — HISTORY OF PRESENT ILLNESS
[de-identified] : Pt presents with h/o Angel-Danlos syndrome, HL, clogged/muffled ears, and dizziness presents today s/p Left Ear Myringotomy with Tube Placement on 2/3/23 c/o ears still feeling clogged. Pt notes yesterday when she was laying down it felt like there was fluid coming out of her right ear.

## 2023-02-13 NOTE — PHYSICAL EXAM
[Cuevas Test Lateralizes To Right] : tone lateralization to the right [Midline] : trachea located in midline position [Normal] : orientation to person, place, and time: normal [de-identified] : left TMJ tenderness [de-identified] : tube in place [de-identified] : inflamed turbs  [de-identified] : dry blood [de-identified] : type 3 oral cavity [FreeTextEntry2] : tender over maxillaries, ethmoids

## 2023-02-13 NOTE — DATA REVIEWED
[de-identified] : type A tymp AD; type B tymp AS\par AD: hearing wnl 250-6000 Hz to a mild HF HL at 8000 Hz\par AS: moderate to mild -8000 Hz

## 2023-02-13 NOTE — ADDENDUM
[FreeTextEntry1] : Documented by Altagracia Gutierrez acting as scribe for Dr. Mehta on 02/13/2023.\par \par All Medical record entries made by the scribe were at my, Dr. Mehta,direction and personally dictated by me on 02/13/2023. I have reviewed the chart and agree that the record accurately reflects my personal performance of the history, physical exam, assessment and plan. I have also personally directed, reviewed, and agreed with the discharge instructions.

## 2023-02-13 NOTE — CONSULT LETTER
[Dear  ___] : Dear  [unfilled], [Courtesy Letter:] : I had the pleasure of seeing your patient, [unfilled], in my office today. [Please see my note below.] : Please see my note below. [Consult Closing:] : Thank you very much for allowing me to participate in the care of this patient.  If you have any questions, please do not hesitate to contact me. [Sincerely,] : Sincerely, [FreeTextEntry3] : Claude Mehta MD FACS

## 2023-02-13 NOTE — ASSESSMENT
[FreeTextEntry1] : Reviewed and reconciled medications, allergies, PMHx, PSHx, SocHx, FMHx.\par \par Pt presents with h/o Angel-Danlos syndrome, HL, clogged/muffled ears, and dizziness presents today s/p Left Ear Myringotomy with Tube Placement on 2/3/23 c/o ears still feeling clogged. \par \par Physical Exam -\par left tube in place, tuning fork lateralizes to the right\par left TMJ tenderness\par tender over maxillaries, ethmoids\par inflamed turbs, dry blood\par \par Audio: 2/6/23\par Right Type A ECV 1.2TPP -4\par Left Type B ECV 1.6 TPP NP\par \par Audio:\par type A tymp AD ECV 1.2; type B tymp AS ECV 1.7, ETD AD; CNT AS\par AD: hearing wnl 250-6000 Hz to a mild HF HL at 8000 Hz\par AS: moderate to mild -8000 Hz \par \par Flexible nasal endoscopy \par left: deviated septum, middle meatus normal, mucoid discharge\par right: completely clear, can't get to nasopharynx\par \par Plan:\par Flexible nasal endoscopy. Audio - results interpreted by Dr. Mehta and reviewed with the patient. CT Sinus, nasopharynx, and ear ordered. FU after scan.

## 2023-02-24 RX ORDER — AZELASTINE HYDROCHLORIDE 137 UG/1
137 SPRAY, METERED NASAL TWICE DAILY
Qty: 3 | Refills: 3 | Status: ACTIVE | COMMUNITY
Start: 2023-02-24 | End: 1900-01-01

## 2023-03-06 ENCOUNTER — APPOINTMENT (OUTPATIENT)
Dept: OTOLARYNGOLOGY | Facility: CLINIC | Age: 53
End: 2023-03-06
Payer: MEDICAID

## 2023-03-06 VITALS
WEIGHT: 135 LBS | SYSTOLIC BLOOD PRESSURE: 132 MMHG | BODY MASS INDEX: 26.5 KG/M2 | DIASTOLIC BLOOD PRESSURE: 85 MMHG | HEIGHT: 60 IN | HEART RATE: 93 BPM

## 2023-03-06 DIAGNOSIS — J31.0 CHRONIC RHINITIS: ICD-10-CM

## 2023-03-06 PROCEDURE — 99214 OFFICE O/P EST MOD 30 MIN: CPT | Mod: 25

## 2023-03-06 PROCEDURE — 92504 EAR MICROSCOPY EXAMINATION: CPT

## 2023-03-06 RX ORDER — PREDNISONE 10 MG/1
10 TABLET ORAL
Qty: 30 | Refills: 0 | Status: ACTIVE | COMMUNITY
Start: 2023-03-06 | End: 1900-01-01

## 2023-03-06 NOTE — PHYSICAL EXAM
[Binocular Microscopic Exam] : Binocular microscopic exam was performed [Normal] : gums are normal [de-identified] : tube patent and intact

## 2023-03-06 NOTE — HISTORY OF PRESENT ILLNESS
[de-identified] : 51 y/o F presents for evaluation for decreased hearing/fluid behind the ears, imbalance\par reports starting antibiotic in december for possible ear infection\par antibiotics were ineffective; started on steroids, tube placed in left ear by dr olivares\par reports decreased hearing in left ear; occasionally decreased in right, constant tinnitus/ticking in ears, nasal congestion/sinus pressure; flonase makes congestion and sinus pressure worst, constantly feels off balance\par denies otalgia, recent otorrhea, or headaches related to hearing\par CT mastoid/IAC and sinus done in 02/2023

## 2023-03-06 NOTE — ASSESSMENT
[FreeTextEntry1] : Otoscopic exam today shows dry patent left PE tube inferiorly, flange of tube resting against inferior ear canal.  Remaining tympanic membrane appears clear, no structural abnormalities, no retraction.  Right tympanic membrane intact without effusion or retraction.  I personally reviewed and interpreted recent audiogram results, which shows a left low-frequency conductive hearing loss rising to normal hearing, with minor decline in the high frequencies.  Right ear has normal hearing with type A tympanogram.  I personally reviewed and interpreted prior CT results, which shows a well pneumatized, well aerated left middle ear and mastoid cavity.  There is minor residual fluid near mastoid tip.\par \par Counseled patient that her ongoing left ear symptoms and imbalance are not consistent with the presence of minor residual left mastoid fluid, which would be expected following ventilating tube insertion for serous otitis media.  Offered another trial of a mid strength prednisone taper for 12 days.  If this does not improve symptoms would likely recommend tube removal and patch myringoplasty.  If her symptoms persist beyond this, discussed the option of mastoidectomy for minor residual mastoid fluid, but I counseled her that this procedure may not improve her symptoms and would not lead directly to significant improvement in hearing.

## 2023-03-20 ENCOUNTER — APPOINTMENT (OUTPATIENT)
Dept: OTOLARYNGOLOGY | Facility: CLINIC | Age: 53
End: 2023-03-20
Payer: MEDICAID

## 2023-03-20 VITALS
SYSTOLIC BLOOD PRESSURE: 142 MMHG | BODY MASS INDEX: 26.5 KG/M2 | DIASTOLIC BLOOD PRESSURE: 92 MMHG | WEIGHT: 135 LBS | HEIGHT: 60 IN

## 2023-03-20 DIAGNOSIS — H65.22 CHRONIC SEROUS OTITIS MEDIA, LEFT EAR: ICD-10-CM

## 2023-03-20 DIAGNOSIS — R26.89 OTHER ABNORMALITIES OF GAIT AND MOBILITY: ICD-10-CM

## 2023-03-20 DIAGNOSIS — Z96.22 MYRINGOTOMY TUBE(S) STATUS: ICD-10-CM

## 2023-03-20 DIAGNOSIS — H90.12 CONDUCTIVE HEARING LOSS, UNILATERAL, LEFT EAR, WITH UNRESTRICTED HEARING ON THE CONTRALATERAL SIDE: ICD-10-CM

## 2023-03-20 DIAGNOSIS — H93.8X3 OTHER SPECIFIED DISORDERS OF EAR, BILATERAL: ICD-10-CM

## 2023-03-20 PROCEDURE — 92567 TYMPANOMETRY: CPT

## 2023-03-20 PROCEDURE — 99214 OFFICE O/P EST MOD 30 MIN: CPT | Mod: 25

## 2023-03-20 PROCEDURE — 69200 CLEAR OUTER EAR CANAL: CPT

## 2023-03-20 PROCEDURE — 92557 COMPREHENSIVE HEARING TEST: CPT

## 2023-03-20 NOTE — HISTORY OF PRESENT ILLNESS
[de-identified] : 53 y/o F presents for follow up for imbalance issues\par reports no improvement in symptoms, still feels off balance\par reports hearing an echo in ear when turning head\par sinus pressure/ nasal congestions persists, feels fluid and ear fullness\par hx of ventilating tube insertion\par reports interest in having surgery

## 2023-03-20 NOTE — PROCEDURE
[FreeTextEntry3] : Procedure note:  Left ear canal foreign body removal\par \par Description of Procedure:  A left ear canal foreign body was noted requiring removal under the operating microscope using otologic instrumentation.  The patient tolerated the procedure without complications.\par \par \par Procedure note:  Removal of ear tube\par \par Mar 20, 2023 \par \par Removal of ear tube placed by another physician was removed under binocular microscopy using otologic instrumentation. \par \par

## 2023-03-20 NOTE — ASSESSMENT
[FreeTextEntry1] : Otoscopic exam today shows a left ear canal foreign body extruded left PE tube which was removed under binocular microscopy.  There was a residual perforation at the site of tube extrusion.  I performed patch myringoplasty with patient's consent, but she reports subjective worsening of symptoms, so patch was removed.  I personally ordered and reviewed an audiogram for her hearing loss, which shows improvement in her left asymmetric high-frequency loss, still with persistent low-frequency conductive loss in the left ear.\par \par Recommended VNG testing with air calorics for disequilibrium, which patient describes as a "drunken" sensation or a feeling that she is on a boat.  She also reports ongoing left ear pressure/fullness and echoing sensation.  I reviewed her temporal bone CT findings once again counseled her that there are minimal inflammatory changes involving the left mastoid, and her left ear symptomatology is more severe than would be expected from the mild inflammatory changes noted on CT.  I also counseled her that it is not clear that surgical intervention benefit from any of her left ear symptoms, and could make her worse.  She expressed understanding but wishes to proceed with left mastoidectomy as she has been having ongoing left ear pressure/fullness for nearly 3 months.  Counseled her regarding other pathology that may contribute to her symptoms, including migraine.  Patient has a history of fibromyalgia as well and takes cyclobenzaprine.  I counseled her that she may wish to seek an alternative opinion from another otologist prior to moving forward with left ear surgery.\par \par Discussion of mastoidectomy with possible tube insertion surgery risks, benefits, and alternatives:\par \par Risks of mastoidectomy with possible tube insertion were discussed with the patient and would include but are not limited to bleeding, infection, persistent pain, persistent perforation, persistent drainage, failure to improve symptoms of ear fullness/pressure, worsened hearing including risk of profound hearing loss, persistent dizziness, persistent tinnitus, facial nerve injury, taste changes, cerebrospinal fluid leak, or other intracranial injury/infection.  Benefits would include improvement in symptoms of ear pressure/fullness.  Counseled patient that surgery would not have any direct effect on her symptoms of disequilibrium.   Alternatives would include observation or continued medical management.  The patient wishes to proceed with surgery.\par \par I spent a total of 30 minutes on the date of the encounter evaluating and treating the patient.\par \par \par

## 2023-03-20 NOTE — PHYSICAL EXAM
[Binocular Microscopic Exam] : Binocular microscopic exam was performed [Normal] : the left external ear was normal [de-identified] : tube in place

## 2023-03-20 NOTE — DATA REVIEWED
[de-identified] : type A tymp AD; Large ECV AS\par AD: hearing wnl 250-6000 Hz to a mild HF HL at 8000 Hz\par AS: Mild -500 Hz to WNL to a mild HF HL thereafter

## 2023-04-03 ENCOUNTER — APPOINTMENT (OUTPATIENT)
Dept: OTOLARYNGOLOGY | Facility: CLINIC | Age: 53
End: 2023-04-03
